# Patient Record
Sex: FEMALE | Race: BLACK OR AFRICAN AMERICAN | URBAN - METROPOLITAN AREA
[De-identification: names, ages, dates, MRNs, and addresses within clinical notes are randomized per-mention and may not be internally consistent; named-entity substitution may affect disease eponyms.]

---

## 2020-10-05 ENCOUNTER — HISTORICAL (OUTPATIENT)
Dept: ADMINISTRATIVE | Facility: HOSPITAL | Age: 60
End: 2020-10-05

## 2020-10-07 LAB — FINAL CULTURE: NORMAL

## 2024-05-01 ENCOUNTER — APPOINTMENT (RX ONLY)
Dept: URBAN - METROPOLITAN AREA CLINIC 139 | Facility: CLINIC | Age: 64
Setting detail: DERMATOLOGY
End: 2024-05-01

## 2024-05-01 DIAGNOSIS — H01.13 ECZEMATOUS DERMATITIS OF EYELID: ICD-10-CM

## 2024-05-01 DIAGNOSIS — L82.1 OTHER SEBORRHEIC KERATOSIS: ICD-10-CM

## 2024-05-01 DIAGNOSIS — L40.0 PSORIASIS VULGARIS: ICD-10-CM

## 2024-05-01 PROBLEM — L30.9 DERMATITIS, UNSPECIFIED: Status: ACTIVE | Noted: 2024-05-01

## 2024-05-01 PROBLEM — H01.134 ECZEMATOUS DERMATITIS OF LEFT UPPER EYELID: Status: ACTIVE | Noted: 2024-05-01

## 2024-05-01 PROCEDURE — ? ORDER TESTS

## 2024-05-01 PROCEDURE — ? PRESCRIPTION

## 2024-05-01 PROCEDURE — 99203 OFFICE O/P NEW LOW 30 MIN: CPT

## 2024-05-01 PROCEDURE — ? COUNSELING

## 2024-05-01 RX ORDER — FLUOCINOLONE ACETONIDE 0.11 MG/ML
OIL AURICULAR (OTIC)
Qty: 20 | Refills: 2 | Status: ERX | COMMUNITY
Start: 2024-05-01

## 2024-05-01 RX ORDER — CEPHALEXIN 500 MG/1
CAPSULE ORAL
Qty: 20 | Refills: 0 | Status: ERX | COMMUNITY
Start: 2024-05-01

## 2024-05-01 RX ORDER — PIMECROLIMUS 10 MG/G
CREAM TOPICAL BID
Qty: 30 | Refills: 1 | Status: ERX | COMMUNITY
Start: 2024-05-01

## 2024-05-01 RX ADMIN — FLUOCINOLONE ACETONIDE 1: 0.11 OIL AURICULAR (OTIC) at 00:00

## 2024-05-01 RX ADMIN — PIMECROLIMUS 1: 10 CREAM TOPICAL at 00:00

## 2024-05-01 RX ADMIN — CEPHALEXIN 1: 500 CAPSULE ORAL at 00:00

## 2024-05-01 ASSESSMENT — LOCATION DETAILED DESCRIPTION DERM
LOCATION DETAILED: LEFT LATERAL SUPERIOR EYELID
LOCATION DETAILED: RIGHT CAVUM CONCHA
LOCATION DETAILED: LEFT INFRAMAMMARY CREASE (INNER QUADRANT)
LOCATION DETAILED: PERIUMBILICAL SKIN

## 2024-05-01 ASSESSMENT — LOCATION SIMPLE DESCRIPTION DERM
LOCATION SIMPLE: RIGHT EAR
LOCATION SIMPLE: LEFT BREAST
LOCATION SIMPLE: LEFT SUPERIOR EYELID
LOCATION SIMPLE: ABDOMEN

## 2024-05-01 ASSESSMENT — LOCATION ZONE DERM
LOCATION ZONE: EAR
LOCATION ZONE: EYELID
LOCATION ZONE: TRUNK

## 2024-05-01 NOTE — PROCEDURE: ORDER TESTS
Bill For Surgical Tray: no
Expected Date Of Service: 05/01/2024
Performing Laboratory: 0
Billing Type: Third-Party Bill

## 2024-05-01 NOTE — PROCEDURE: COUNSELING
Detail Level: Simple
Quality 410: Psoriasis Clinical Response To Oral Systemic Or Biologic Medications: Patient has been on biologic or system therapy for less than 6 months
Detail Level: Generalized

## 2025-01-29 DIAGNOSIS — S82.852S DISPLACED TRIMALLEOLAR FRACTURE OF LEFT LOWER LEG, SEQUELA: Primary | ICD-10-CM

## 2025-02-10 ENCOUNTER — HOSPITAL ENCOUNTER (OUTPATIENT)
Dept: RADIOLOGY | Facility: HOSPITAL | Age: 65
Discharge: HOME | End: 2025-02-10
Attending: STUDENT IN AN ORGANIZED HEALTH CARE EDUCATION/TRAINING PROGRAM
Payer: MEDICAID

## 2025-02-10 ENCOUNTER — OFFICE VISIT (OUTPATIENT)
Dept: ORTHOPEDICS | Facility: CLINIC | Age: 65
End: 2025-02-10
Payer: MEDICAID

## 2025-02-10 VITALS
DIASTOLIC BLOOD PRESSURE: 80 MMHG | BODY MASS INDEX: 33.49 KG/M2 | TEMPERATURE: 98 F | HEIGHT: 63 IN | SYSTOLIC BLOOD PRESSURE: 132 MMHG | WEIGHT: 189 LBS

## 2025-02-10 DIAGNOSIS — S82.852S DISPLACED TRIMALLEOLAR FRACTURE OF LEFT LOWER LEG, SEQUELA: ICD-10-CM

## 2025-02-10 DIAGNOSIS — S82.852S DISPLACED TRIMALLEOLAR FRACTURE OF LEFT LOWER LEG, SEQUELA: Primary | ICD-10-CM

## 2025-02-10 PROCEDURE — 99215 OFFICE O/P EST HI 40 MIN: CPT | Mod: PBBFAC,25

## 2025-02-10 PROCEDURE — 73610 X-RAY EXAM OF ANKLE: CPT | Mod: TC,LT

## 2025-02-10 RX ORDER — HYDROCODONE BITARTRATE AND ACETAMINOPHEN 10; 325 MG/1; MG/1
1 TABLET ORAL EVERY 6 HOURS
COMMUNITY
Start: 2025-01-27 | End: 2025-02-10

## 2025-02-10 RX ORDER — CEFAZOLIN SODIUM 2 G/50ML
2 SOLUTION INTRAVENOUS
Status: CANCELLED | OUTPATIENT
Start: 2025-02-10

## 2025-02-10 RX ORDER — ROSUVASTATIN CALCIUM 40 MG/1
40 TABLET, COATED ORAL
COMMUNITY
Start: 2025-01-27

## 2025-02-10 RX ORDER — IRON,CARB/VIT C/VIT B12/FOLIC 100-250-1
1 TABLET ORAL
COMMUNITY
Start: 2024-11-22

## 2025-02-10 RX ORDER — CHOLECALCIFEROL (VITAMIN D3) 625 MCG
25000 CAPSULE ORAL
COMMUNITY
Start: 2025-02-06

## 2025-02-10 RX ORDER — NITROFURANTOIN 25; 75 MG/1; MG/1
100 CAPSULE ORAL 2 TIMES DAILY
COMMUNITY
Start: 2024-12-16

## 2025-02-10 RX ORDER — HYDROCODONE BITARTRATE AND ACETAMINOPHEN 5; 325 MG/1; MG/1
1 TABLET ORAL EVERY 6 HOURS PRN
Qty: 12 TABLET | Refills: 0 | Status: SHIPPED | OUTPATIENT
Start: 2025-02-10 | End: 2025-02-13

## 2025-02-10 RX ORDER — COVID-19 ANTIGEN TEST
KIT MISCELLANEOUS
COMMUNITY
Start: 2024-09-09

## 2025-02-10 RX ORDER — DULOXETIN HYDROCHLORIDE 60 MG/1
60 CAPSULE, DELAYED RELEASE ORAL
COMMUNITY
Start: 2025-01-27

## 2025-02-10 RX ORDER — MUPIROCIN 20 MG/G
OINTMENT TOPICAL
Status: CANCELLED | OUTPATIENT
Start: 2025-02-10

## 2025-02-10 RX ORDER — SODIUM CHLORIDE 9 MG/ML
INJECTION, SOLUTION INTRAVENOUS CONTINUOUS
Status: CANCELLED | OUTPATIENT
Start: 2025-02-10

## 2025-02-10 RX ORDER — POTASSIUM CHLORIDE 750 MG/1
10 TABLET, EXTENDED RELEASE ORAL
COMMUNITY
Start: 2025-01-27

## 2025-02-10 RX ORDER — OLMESARTAN MEDOXOMIL AND HYDROCHLOROTHIAZIDE 40/25 40; 25 MG/1; MG/1
1 TABLET ORAL
COMMUNITY
Start: 2025-01-27

## 2025-02-10 RX ORDER — AMLODIPINE BESYLATE 10 MG/1
10 TABLET ORAL
COMMUNITY
Start: 2025-01-27
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
## 2025-02-10 NOTE — LETTER
February 10, 2025      [facility]  [address] [place] [address]  Phone: [phone]       Patient: Lacy Adhikari   YOB: 1960  Date of Visit: 02/10/2025    To Whom It May Concern:    Eliazar Adhikari  was at [facility] on 02/10/2025. Patient will be out of work for 12 weeks due to surgery for ankle injury. If you have any questions or concerns, or if I can be of further assistance, please do not hesitate to contact me.    Sincerely,    Mimi Fan MA

## 2025-02-11 ENCOUNTER — ANESTHESIA EVENT (OUTPATIENT)
Dept: SURGERY | Facility: HOSPITAL | Age: 65
End: 2025-02-11
Payer: MEDICAID

## 2025-02-11 NOTE — ANESTHESIA PREPROCEDURE EVALUATION
Lacy Adhikari is a 64 y.o. female presenting for ORIF, ANKLE (Left: Ankle) with a history of   -Displaced trimalleolar fracture of left lower leg   -HTN  -HLD  -OBESITY, BMI 33    BETA-BLOCKER: NONE    New Orders for Anesthesia: BMP      Lab Results   Component Value Date    WBC 18.4 (H) 04/29/2017    HGB 12.2 04/29/2017    HCT 37.4 04/29/2017     04/29/2017       CMP  Sodium   Date Value Ref Range Status   04/29/2017 138 136 - 145 mmol/L Final     Potassium   Date Value Ref Range Status   04/29/2017 4.1 3.5 - 5.1 mmol/L Final     Chloride   Date Value Ref Range Status   04/29/2017 96 (L) 98 - 107 mmol/L Final     CO2   Date Value Ref Range Status   04/29/2017 32.0 21.0 - 32.0 mmol/L Final     Blood Urea Nitrogen   Date Value Ref Range Status   04/29/2017 21.0 (H) 7.0 - 18.0 mg/dL Final     Creatinine   Date Value Ref Range Status   04/29/2017 0.74 0.55 - 1.02 mg/dL Final     Calcium   Date Value Ref Range Status   04/29/2017 9.1 8.5 - 10.1 mg/dL Final       CARDS OV 3/19/24        Current Outpatient Medications   Medication Instructions    amLODIPine (NORVASC) 10 mg    DECARA 25,000 Units, Every 7 days    DULoxetine (CYMBALTA) 60 mg    [place] COVID-19 RAPID AT-HOME Kit Test as directed per  and cdc guidance    HYDROcodone-acetaminophen (NORCO) 5-325 mg per tablet 1 tablet, Oral, Every 6 hours PRN    IRON 100 PLUS Tab 1 tablet    nitrofurantoin, macrocrystal-monohydrate, (MACROBID) 100 MG capsule 100 mg, 2 times daily    olmesartan-hydrochlorothiazide (BENICAR HCT) 40-25 mg per tablet 1 tablet    potassium chloride (KLOR-CON) 10 MEQ TbSR 10 mEq    rosuvastatin (CRESTOR) 40 mg         Pre-op Assessment    I have reviewed the Patient Summary Reports.     I have reviewed the Nursing Notes. I have reviewed the NPO Status.   I have reviewed the Medications.     Review of Systems  Anesthesia Hx:  No problems with previous Anesthesia             Denies Family Hx of Anesthesia complications.     Denies Personal Hx of Anesthesia complications.                    Social:  Non-Smoker, No Alcohol Use       Hematology/Oncology:  Hematology Normal   Oncology Normal                                   EENT/Dental:  EENT/Dental Normal           Cardiovascular:     Hypertension, well controlled                                          Pulmonary:  Pulmonary Normal                       Renal/:  Renal/ Normal                 Hepatic/GI:  Hepatic/GI Normal                    Musculoskeletal:  Arthritis               Neurological:  Neurology Normal                                      Endocrine:  Endocrine Normal            Dermatological:  Skin Normal    Psych:  Psychiatric Normal                    Physical Exam  General: Well nourished, Cooperative, Alert and Oriented    Airway:  Mallampati: II / II  Mouth Opening: Normal  TM Distance: Normal  Tongue: Normal  Neck ROM: Normal ROM    Dental:  Intact        Anesthesia Plan  Type of Anesthesia, risks & benefits discussed:    Anesthesia Type: Gen ETT, Gen Supraglottic Airway, Gen Natural Airway  Intra-op Monitoring Plan: Standard ASA Monitors  Post Op Pain Control Plan: IV/PO Opioids PRN and peripheral nerve block  Induction:  IV  Airway Plan: Direct, Post-Induction  Informed Consent: Informed consent signed with the Patient and all parties understand the risks and agree with anesthesia plan.  All questions answered.   ASA Score: 2  Day of Surgery Review of History & Physical: I have interviewed and examined the patient. I have reviewed the patient's H&P dated:     Ready For Surgery From Anesthesia Perspective.     .

## 2025-02-12 NOTE — DISCHARGE SUMMARY
[facility]  Brief Operative Note    Surgery Date:  2/13/2025    Surgeon(s) and Role:     Artur Davies - Primary    Assisting Surgeon: Inocente Tim MD    Pre-op Diagnosis:  Trimalleolar fracture    Post-op Diagnosis:    Post-Op Diagnosis Codes:     * Displaced trimalleolar fracture of left lower leg, sequela [S82.852S]    Procedure(s) (LRB):  ORIF, ANKLE (Left)    Anesthesia: Choice    Operative Findings:  Left ankle trimalleolar fracture    Estimated Blood Loss: see full op note          Specimens:   Specimen (24h ago, onward)      None              Discharge Note    OUTCOME: Patient tolerated treatment/procedure well without complication and is now ready for discharge.    DISPOSITION: Home or Self Care    FINAL DIAGNOSIS:  Left ankle trimalleolar fracture    FOLLOWUP: In clinic    DISCHARGE INSTRUCTIONS:  Keep dressing clean, dry, intact. Take prescribed medications as needed for pain. No weight bearing through the operative extremity.
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
## 2025-02-13 ENCOUNTER — ANESTHESIA (OUTPATIENT)
Dept: SURGERY | Facility: HOSPITAL | Age: 65
End: 2025-02-13
Payer: MEDICAID

## 2025-02-13 ENCOUNTER — HOSPITAL ENCOUNTER (OUTPATIENT)
Facility: HOSPITAL | Age: 65
Discharge: HOME | End: 2025-02-13
Attending: REHABILITATION UNIT | Admitting: SPECIALIST
Payer: MEDICAID

## 2025-02-13 VITALS
OXYGEN SATURATION: 97 % | SYSTOLIC BLOOD PRESSURE: 110 MMHG | DIASTOLIC BLOOD PRESSURE: 58 MMHG | TEMPERATURE: 98 F | HEIGHT: 63 IN | WEIGHT: 199.19 LBS | HEART RATE: 95 BPM | RESPIRATION RATE: 18 BRPM | BODY MASS INDEX: 35.29 KG/M2

## 2025-02-13 DIAGNOSIS — S82.852A CLOSED DISPLACED TRIMALLEOLAR FRACTURE OF LEFT ANKLE, INITIAL ENCOUNTER: Primary | ICD-10-CM

## 2025-02-13 DIAGNOSIS — S82.852S DISPLACED TRIMALLEOLAR FRACTURE OF LEFT LOWER LEG, SEQUELA: ICD-10-CM

## 2025-02-13 LAB
ANION GAP SERPL CALC-SCNC: 10 MEQ/L
BUN SERPL-MCNC: 19.2 MG/DL (ref 9.8–20.1)
CALCIUM SERPL-MCNC: 10 MG/DL (ref 8.4–10.2)
CHLORIDE SERPL-SCNC: 99 MMOL/L (ref 98–107)
CO2 SERPL-SCNC: 31 MMOL/L (ref 23–31)
CREAT SERPL-MCNC: 1.02 MG/DL (ref 0.55–1.02)
CREAT/UREA NIT SERPL: 19
GFR SERPLBLD CREATININE-BSD FMLA CKD-EPI: >60 ML/MIN/1.73/M2
GLUCOSE SERPL-MCNC: 114 MG/DL (ref 82–115)
POTASSIUM SERPL-SCNC: 3.2 MMOL/L (ref 3.5–5.1)
SODIUM SERPL-SCNC: 140 MMOL/L (ref 136–145)

## 2025-02-13 PROCEDURE — 27201423 OPTIME MED/SURG SUP & DEVICES STERILE SUPPLY: Performed by: SPECIALIST

## 2025-02-13 PROCEDURE — 37000008 HC ANESTHESIA 1ST 15 MINUTES: Performed by: SPECIALIST

## 2025-02-13 PROCEDURE — 36000708 HC OR TIME LEV III 1ST 15 MIN: Performed by: SPECIALIST

## 2025-02-13 PROCEDURE — 64445 NJX AA&/STRD SCIATIC NRV IMG: CPT | Performed by: ANESTHESIOLOGY

## 2025-02-13 PROCEDURE — 71000016 HC POSTOP RECOV ADDL HR: Performed by: SPECIALIST

## 2025-02-13 PROCEDURE — 63600175 PHARM REV CODE 636 W HCPCS: Mod: JZ,TB | Performed by: ANESTHESIOLOGY

## 2025-02-13 PROCEDURE — 63600175 PHARM REV CODE 636 W HCPCS: Performed by: ANESTHESIOLOGY

## 2025-02-13 PROCEDURE — 63600175 PHARM REV CODE 636 W HCPCS: Performed by: NURSE ANESTHETIST, CERTIFIED REGISTERED

## 2025-02-13 PROCEDURE — 37000009 HC ANESTHESIA EA ADD 15 MINS: Performed by: SPECIALIST

## 2025-02-13 PROCEDURE — 80048 BASIC METABOLIC PNL TOTAL CA: CPT | Performed by: NURSE PRACTITIONER

## 2025-02-13 PROCEDURE — 25000003 PHARM REV CODE 250: Performed by: ANESTHESIOLOGY

## 2025-02-13 PROCEDURE — 36000709 HC OR TIME LEV III EA ADD 15 MIN: Performed by: SPECIALIST

## 2025-02-13 PROCEDURE — C1713 ANCHOR/SCREW BN/BN,TIS/BN: HCPCS | Performed by: SPECIALIST

## 2025-02-13 PROCEDURE — 71000015 HC POSTOP RECOV 1ST HR: Performed by: SPECIALIST

## 2025-02-13 PROCEDURE — 63600175 PHARM REV CODE 636 W HCPCS: Performed by: SPECIALIST

## 2025-02-13 PROCEDURE — 71000033 HC RECOVERY, INTIAL HOUR: Performed by: SPECIALIST

## 2025-02-13 DEVICE — IMPLANTABLE DEVICE: Type: IMPLANTABLE DEVICE | Site: ANKLE | Status: FUNCTIONAL

## 2025-02-13 DEVICE — SCREW CORT ST T15 3.5X14MM: Type: IMPLANTABLE DEVICE | Site: ANKLE | Status: FUNCTIONAL

## 2025-02-13 DEVICE — SCREW PANGEA LOK T8 2.7X12MM: Type: IMPLANTABLE DEVICE | Site: ANKLE | Status: FUNCTIONAL

## 2025-02-13 DEVICE — SCREW BONE CORTICAL 3.5X24MM T: Type: IMPLANTABLE DEVICE | Site: ANKLE | Status: FUNCTIONAL

## 2025-02-13 RX ORDER — IBUPROFEN 800 MG/1
800 TABLET ORAL EVERY 6 HOURS PRN
Qty: 30 TABLET | Refills: 0 | Status: SHIPPED | OUTPATIENT
Start: 2025-02-13

## 2025-02-13 RX ORDER — ASPIRIN 81 MG/1
81 TABLET ORAL 2 TIMES DAILY
Qty: 84 TABLET | Refills: 0 | Status: SHIPPED | OUTPATIENT
Start: 2025-02-13 | End: 2025-03-27

## 2025-02-13 RX ORDER — SODIUM CHLORIDE, SODIUM LACTATE, POTASSIUM CHLORIDE, CALCIUM CHLORIDE 600; 310; 30; 20 MG/100ML; MG/100ML; MG/100ML; MG/100ML
125 INJECTION, SOLUTION INTRAVENOUS CONTINUOUS
Status: ACTIVE | OUTPATIENT
Start: 2025-02-13 | End: 2025-02-13

## 2025-02-13 RX ORDER — DIPHENHYDRAMINE HYDROCHLORIDE 50 MG/ML
12.5 INJECTION INTRAMUSCULAR; INTRAVENOUS ONCE AS NEEDED
Status: DISCONTINUED | OUTPATIENT
Start: 2025-02-13 | End: 2025-02-13 | Stop reason: HOSPADM

## 2025-02-13 RX ORDER — GABAPENTIN 300 MG/1
300 CAPSULE ORAL 3 TIMES DAILY
Qty: 90 CAPSULE | Refills: 0 | Status: SHIPPED | OUTPATIENT
Start: 2025-02-13 | End: 2025-03-15

## 2025-02-13 RX ORDER — CEFAZOLIN SODIUM 1 G/3ML
2 INJECTION, POWDER, FOR SOLUTION INTRAMUSCULAR; INTRAVENOUS
Status: DISCONTINUED | OUTPATIENT
Start: 2025-02-13 | End: 2025-02-13 | Stop reason: HOSPADM

## 2025-02-13 RX ORDER — IPRATROPIUM BROMIDE AND ALBUTEROL SULFATE 2.5; .5 MG/3ML; MG/3ML
3 SOLUTION RESPIRATORY (INHALATION) ONCE AS NEEDED
Status: DISCONTINUED | OUTPATIENT
Start: 2025-02-13 | End: 2025-02-13 | Stop reason: HOSPADM

## 2025-02-13 RX ORDER — METHOCARBAMOL 500 MG/1
500 TABLET, FILM COATED ORAL 3 TIMES DAILY
Qty: 30 TABLET | Refills: 0 | Status: SHIPPED | OUTPATIENT
Start: 2025-02-13 | End: 2025-02-23

## 2025-02-13 RX ORDER — PROPOFOL 10 MG/ML
INJECTION, EMULSION INTRAVENOUS
Status: DISCONTINUED | OUTPATIENT
Start: 2025-02-13 | End: 2025-02-13

## 2025-02-13 RX ORDER — OXYCODONE HYDROCHLORIDE 5 MG/1
5 TABLET ORAL EVERY 4 HOURS PRN
Qty: 30 TABLET | Refills: 0 | Status: SHIPPED | OUTPATIENT
Start: 2025-02-13 | End: 2025-02-20

## 2025-02-13 RX ORDER — HYDROMORPHONE HYDROCHLORIDE 1 MG/ML
0.4 INJECTION, SOLUTION INTRAMUSCULAR; INTRAVENOUS; SUBCUTANEOUS EVERY 10 MIN PRN
Status: DISCONTINUED | OUTPATIENT
Start: 2025-02-13 | End: 2025-02-13 | Stop reason: HOSPADM

## 2025-02-13 RX ORDER — ROPIVACAINE HYDROCHLORIDE 5 MG/ML
INJECTION, SOLUTION EPIDURAL; INFILTRATION; PERINEURAL
Status: COMPLETED | OUTPATIENT
Start: 2025-02-13 | End: 2025-02-13

## 2025-02-13 RX ORDER — OXYCODONE AND ACETAMINOPHEN 5; 325 MG/1; MG/1
1 TABLET ORAL
Status: DISCONTINUED | OUTPATIENT
Start: 2025-02-13 | End: 2025-02-13 | Stop reason: HOSPADM

## 2025-02-13 RX ORDER — MIDAZOLAM HYDROCHLORIDE 1 MG/ML
2 INJECTION INTRAMUSCULAR; INTRAVENOUS ONCE
Status: COMPLETED | OUTPATIENT
Start: 2025-02-13 | End: 2025-02-13

## 2025-02-13 RX ORDER — ACETAMINOPHEN 500 MG
500 TABLET ORAL EVERY 6 HOURS PRN
Qty: 30 TABLET | Refills: 0 | Status: SHIPPED | OUTPATIENT
Start: 2025-02-13

## 2025-02-13 RX ORDER — DEXAMETHASONE SODIUM PHOSPHATE 4 MG/ML
INJECTION, SOLUTION INTRA-ARTICULAR; INTRALESIONAL; INTRAMUSCULAR; INTRAVENOUS; SOFT TISSUE
Status: DISCONTINUED | OUTPATIENT
Start: 2025-02-13 | End: 2025-02-13

## 2025-02-13 RX ORDER — SODIUM CHLORIDE 9 MG/ML
INJECTION, SOLUTION INTRAVENOUS CONTINUOUS
Status: DISCONTINUED | OUTPATIENT
Start: 2025-02-13 | End: 2025-02-13 | Stop reason: HOSPADM

## 2025-02-13 RX ORDER — MUPIROCIN 20 MG/G
OINTMENT TOPICAL
Status: DISCONTINUED | OUTPATIENT
Start: 2025-02-13 | End: 2025-02-13 | Stop reason: HOSPADM

## 2025-02-13 RX ORDER — BUPIVACAINE HYDROCHLORIDE 5 MG/ML
INJECTION, SOLUTION EPIDURAL; INTRACAUDAL
Status: COMPLETED | OUTPATIENT
Start: 2025-02-13 | End: 2025-02-13

## 2025-02-13 RX ORDER — PHENYLEPHRINE HYDROCHLORIDE 10 MG/ML
INJECTION INTRAVENOUS
Status: DISCONTINUED | OUTPATIENT
Start: 2025-02-13 | End: 2025-02-13

## 2025-02-13 RX ORDER — KETOROLAC TROMETHAMINE 30 MG/ML
30 INJECTION, SOLUTION INTRAMUSCULAR; INTRAVENOUS ONCE AS NEEDED
Status: DISCONTINUED | OUTPATIENT
Start: 2025-02-13 | End: 2025-02-13 | Stop reason: HOSPADM

## 2025-02-13 RX ORDER — GLUCAGON 1 MG
1 KIT INJECTION
Status: DISCONTINUED | OUTPATIENT
Start: 2025-02-13 | End: 2025-02-13 | Stop reason: HOSPADM

## 2025-02-13 RX ORDER — ONDANSETRON HYDROCHLORIDE 2 MG/ML
4 INJECTION, SOLUTION INTRAVENOUS ONCE AS NEEDED
Status: COMPLETED | OUTPATIENT
Start: 2025-02-13 | End: 2025-02-13

## 2025-02-13 RX ORDER — LIDOCAINE HYDROCHLORIDE 20 MG/ML
INJECTION INTRAVENOUS
Status: DISCONTINUED | OUTPATIENT
Start: 2025-02-13 | End: 2025-02-13

## 2025-02-13 RX ADMIN — DEXAMETHASONE SODIUM PHOSPHATE 8 MG: 4 INJECTION, SOLUTION INTRA-ARTICULAR; INTRALESIONAL; INTRAMUSCULAR; INTRAVENOUS; SOFT TISSUE at 09:02

## 2025-02-13 RX ADMIN — HYDROMORPHONE HYDROCHLORIDE 0.4 MG: 1 INJECTION, SOLUTION INTRAMUSCULAR; INTRAVENOUS; SUBCUTANEOUS at 11:02

## 2025-02-13 RX ADMIN — BUPIVACAINE HYDROCHLORIDE 10 ML: 5 INJECTION, SOLUTION EPIDURAL; INTRACAUDAL; PERINEURAL at 08:02

## 2025-02-13 RX ADMIN — PROPOFOL 140 MG: 10 INJECTION, EMULSION INTRAVENOUS at 08:02

## 2025-02-13 RX ADMIN — ROPIVACAINE HYDROCHLORIDE 20 ML: 5 INJECTION, SOLUTION EPIDURAL; INFILTRATION; PERINEURAL at 08:02

## 2025-02-13 RX ADMIN — PHENYLEPHRINE HYDROCHLORIDE 200 MCG: 10 INJECTION INTRAVENOUS at 09:02

## 2025-02-13 RX ADMIN — OXYCODONE HYDROCHLORIDE AND ACETAMINOPHEN 1 TABLET: 5; 325 TABLET ORAL at 11:02

## 2025-02-13 RX ADMIN — LIDOCAINE HYDROCHLORIDE 50 MG: 20 INJECTION INTRAVENOUS at 08:02

## 2025-02-13 RX ADMIN — ONDANSETRON 4 MG: 2 INJECTION INTRAMUSCULAR; INTRAVENOUS at 10:02

## 2025-02-13 RX ADMIN — MIDAZOLAM HYDROCHLORIDE 2 MG: 1 INJECTION, SOLUTION INTRAMUSCULAR; INTRAVENOUS at 08:02

## 2025-02-13 RX ADMIN — PHENYLEPHRINE HYDROCHLORIDE 100 MCG: 10 INJECTION INTRAVENOUS at 09:02

## 2025-02-13 NOTE — OP NOTE
Operative Note     Date of Service: 02/13/2025     Pre-Operative Diagnosis:  Left ankle trimalleolar fracture    Post-Operative Diagnosis: Same     Procedure(s):   1. Open reduction internal fixation left ankle trimalleolar fracture     Anesthesia: General     Surgeon: Artur Davies MD, was present and scrubbed for the key portions of the procedure.     Assistant(s):   Inocente Tim MD    Estimated blood loss: 25cc     Drains: None    Total IV fluids: Per anesthesia records     Complications: None    Specimens: None     Implant:   Implant Name Type Inv. Item Serial No.  Lot No. LRB No. Used Action   SCREW BONE CORTICAL 3.5X24MM T - [id number]  SCREW BONE CORTICAL 3.5X24MM T  [employer].  Left 1 Implanted   SCREW LATASHA ST T15 3.5X14MM - [id number]  SCREW LATASHA ST T15 3.5X14MM  [employer].  Left 3 Implanted   SCREW PANGEA NEL T8 2.7X10MM - [id number]  SCREW PANGEA NEL T8 2.7X10MM  [employer].  Left 5 Implanted   SCREW PANGEA NEL T8 2.7X12MM - [id number]  SCREW PANGEA NEL T8 2.7X12MM  [employer].  Left 2 Implanted   left 4 hole distal lat fib plate    ALLY  Left 1 Implanted   2.7 x 30 locking screw    ALLY  Left 1 Implanted   2.7 x 26 locking screw    ALLY  Left 2 Implanted   2.7 x 28 screw    ALLY  Left 1 Implanted   2.7 x 46 screw    ALLY  Left 1 Implanted   2.7 7 hole hook  plate    ALLY  Left 1 Implanted        Findings: Left ankle tri-malleolar fracture      Indications   Patient is a 64 y.o.female who sustained a left ankle trimalleolar fracture.  We saw the patient in clinic in had discussion with her regarding treatment options.  Patient elected to proceed with operative fixation.  The patient was checked again in the Holding Room. The risks, benefits, complications, treatment options, and expected outcomes were reviewed again with the patient. The patient has elected to proceed with left ankle trimalleolar fracture open reduction internal fixation. The  risks and potential complications include but are not limited to infection, nerve injury, vascular injury, persistent pain, potential skin necrosis, deep vein thrombosis, possible pulmonary embolus, complications of the anesthetics, nonunion, malunion and failure of the implants with potential need for future surgery to remove or revise. The patient concurred with the proposed plan, giving informed consent. The site of surgery was identified by the patient and properly noted/marked by me.       Procedure Details:   The patient was taken to Operating Room #4.  A preoperative block was administered.  The patient was given general anesthesia. The patient was placed supine on regular operating table with a bump under the operative hip and a bone foam.  Nonsterile tourniquet was placed. Prophylacic antibiotics were given. The left lower extremity was prepped and draped in normal sterile manner. A Time-Out was held and the patient, location and procedure of left ankle open reduction internal fixation were verified and agreed upon by all members of the operating room staff.     The tourniquet was inflated to 250 mmHg. An incision directly over the distal fibula was made, extending down to the subcutaneous tissue. Bleeders were identified, isolated, and cauterized.  Dissection was taken down directly to bone.  Periosteum was reflected both anteriorly and posteriorly.  Fracture was debrided with a combination of rongeur and curette.  We are able to reduce the fracture with a combination of lobster claws and point-to-point clamps.  Once our fracture was reduced, we placed a lag screw in a lag by technique design.  Afterwards we had stable fixation of our fracture and we placed a anatomic distal fibula plate.  The distal cluster was filled with locking screws and the proximal shaft screws were placed with cortical screws.  Fluoroscopy was used to confirm appropriate plate length, placement, and reduction in both AP and lateral  views.    We then turned our attention to the medial aspect of the ankle.  An incision was made directly over the medial malleolus and taken directly to bone.  Periosteum was incised and reflected both anteriorly and posteriorly until fracture was revealed.  A combination of rongeur, curette and Franklin was ued to expose our fracture and debride fracture edges.  We used a point-to-point clamp to obtain reduction of our anterior fragment and then placed 2 K-wires through to stabilize it.  In a similar fashion, our distal fragment was stabilized with a point-to-point and K-wires were sent through it.  At this point, we placed a 7 hole hook plate on the medial malleolus.  We placed the most distal screw 1st and then filled shaft screws.  We were able to place 1 screw anteriorly through the anterior fragment through the plate.    Final x-ray were taken including AP, lateral views which demonstrated appropriate reduction of fracture fragments and appropriate placement of hardware.    At this point the tourniquet was let down.  Bleeders were identified and cauterized.  We then closed both the medial and lateral incisions in a layered fashion consisting of 2-0 Vicryl, 3-0 Vicryl, 3-0 nylon.    Sterile dressing was placed consisting of Xeroform, gauze, 4x4s, ABD pad.  A well padded splint was then placed.    Instrument, sponge, and needle counts were correct prior to wound closure and at the conclusion of the case.   The patient was awoken from anesthesia, tolerated the procedure well and taken to recovery in stable condition.       Disposition: Awakened from anesthesia, and taken to the recovery room in a stable condition, having suffered no apparent untoward event     Condition: Stable     Post-Operative Management   Keep dressing clean, dry, intact. Take prescribed medications as needed for pain. No weight bearing through the operative extremity for at least 6 weeks.      Inocente Tim MD  [facility]

## 2025-02-13 NOTE — LETTER
February 13, 2025         [address] [facility] [address]  Phone: [phone]  Fax: [phone]       Patient: Lacy Adhikari   YOB: 1960  Date of Visit: 02/13/2025    To Whom It May Concern:    Eliazar Adhikari  was at [facility] on 02/13/2025. The patient may return to work/school: when released by orthopedic physician. Non weight bearing to left ankle for 6-8 weeks. If you have any questions or concerns, or if I can be of further assistance, please do not hesitate to contact me.    Sincerely,    Virgen DOAN RN

## 2025-02-13 NOTE — DISCHARGE INSTRUCTIONS
Keep follow up appointment at [facility].     · Take pain medication as prescribed.     · Keep ankle elevated on pillow.   NO weight bearing to operative extremity for 6-8 weeks.    · No driving or consuming alcohol for the next 24 hours or while taking narcotic pain medicine.     · May apply ice pack to surgical area for 20 minutes at a time 6-8 times per day.     · Keep dressing clean, intact and dry till clinic visit.     - Notify MD of any moderate to severe pain unrelieved by pain medicine or for any signs of infection including fever above 100.4, excessive redness or swelling, yellow/green foul- smelling drainage, nausea or vomiting. Call clinic at: [phone]. After business hours, if you are unable to reach a doctor on call at [phone] or your concern is an emergency, call 241 or report to your nearest emergency room.     ·  Thanks for choosing [facility]! Have a smooth recovery!

## 2025-02-13 NOTE — ANESTHESIA PROCEDURE NOTES
Intubation    Date/Time: 2/13/2025 8:34 AM    Performed by: Thuy Proctor CRNA  Authorized by: Manuel Olson MD    Intubation:     Induction:  Intravenous    Intubated:  Postinduction    Mask Ventilation:  Not attempted    Attempts:  1    Attempted By:  CRNA    Difficult Airway Encountered?: No      Complications:  None    Airway Device:  Supraglottic airway/LMA    Airway Device Size:  4.0    Style/Cuff Inflation:  Uncuffed    Placement Verified By:  Capnometry    Complicating Factors:  None    Findings Post-Intubation:  BS equal bilateral and atraumatic/condition of teeth unchanged

## 2025-02-13 NOTE — ANESTHESIA PROCEDURE NOTES
Peripheral Block    Patient location during procedure: holding area   Block not for primary anesthetic.  Reason for block: at surgeon's request and post-op pain management   Post-op Pain Location: left ankle fracture   Start time: 2/13/2025 8:12 AM  Timeout: 2/13/2025 8:10 AM   End time: 2/13/2025 8:20 AM    Staffing  Authorizing Provider: Manuel Olson MD  Performing Provider: Manuel Olson MD    Staffing  Performed by: Manuel Olson MD  Authorized by: Manuel Olson MD    Preanesthetic Checklist  Completed: patient identified, IV checked, site marked, risks and benefits discussed, surgical consent, monitors and equipment checked, pre-op evaluation and timeout performed  Peripheral Block  Patient position: right lateral decubitus  Prep: ChloraPrep  Patient monitoring: heart rate, cardiac monitor and frequent blood pressure checks  Block type: popliteal  Laterality: left  Injection technique: single shot  Needle  Needle type: Stimuplex   Needle gauge: 21 G  Needle length: 4 in  Needle localization: ultrasound guidance   -ultrasound image captured on disc.  Assessment  Injection assessment: negative aspiration, negative parasthesia and local visualized surrounding nerve  Heart rate change: no  Slow fractionated injection: yes    Medications:    Medications: ropivacaine (NAROPIN) injection 0.5% - Perineural   20 mL - 2/13/2025 8:20:00 AM

## 2025-02-13 NOTE — ANESTHESIA POSTPROCEDURE EVALUATION
Anesthesia Post Evaluation    Patient: Lacy Adhikari    Procedure(s) Performed: Procedure(s) (LRB):  ORIF, ANKLE (Left)    Final Anesthesia Type: general      Patient location during evaluation: PACU  Patient participation: Yes- Able to Participate  Level of consciousness: awake and alert and oriented  Post-procedure vital signs: reviewed and stable  Pain management: adequate  Airway patency: patent    PONV status at discharge: No PONV  Anesthetic complications: no      Cardiovascular status: blood pressure returned to baseline  Respiratory status: unassisted, spontaneous ventilation and room air  Hydration status: euvolemic  Follow-up not needed.              Vitals Value Taken Time   /58 02/13/25 1300   Temp 36.8 °C (98.2 °F) 02/13/25 1115   Pulse 95 02/13/25 1300   Resp 18 02/13/25 1300   SpO2 97 % 02/13/25 1300         Event Time   Out of Recovery 11:13:00         Pain/Librado Score: Pain Rating Prior to Med Admin: 7 (2/13/2025 11:45 AM)  Librado Score: 10 (2/13/2025 11:13 AM)  Modified Librado Score: 19 (2/13/2025  1:05 PM)

## 2025-02-13 NOTE — TRANSFER OF CARE
"Anesthesia Transfer of Care Note    Patient: Lacy Adhikari    Procedure(s) Performed: Procedure(s) (LRB):  ORIF, ANKLE (Left)    Patient location: PACU    Anesthesia Type: general    Transport from OR: Transported from OR on room air with adequate spontaneous ventilation    Post pain: adequate analgesia    Post assessment: no apparent anesthetic complications and tolerated procedure well    Post vital signs: stable    Level of consciousness: sedated    Nausea/Vomiting: no nausea/vomiting    Complications: none    Transfer of care protocol was followed      Last vitals: Visit Vitals  /77   Pulse 72   Temp 36 °C (96.8 °F) (Temporal)   Resp 20   Ht 5' 2.99" (1.6 m)   Wt 90.4 kg (199 lb 3.2 oz)   SpO2 100%   Breastfeeding No   BMI 35.30 kg/m²     "

## 2025-02-13 NOTE — ANESTHESIA PROCEDURE NOTES
Peripheral Block    Patient location during procedure: holding area   Block not for primary anesthetic.  Reason for block: at surgeon's request and post-op pain management   Post-op Pain Location: left ankle   Start time: 2/13/2025 8:20 AM  Timeout: 2/13/2025 8:10 AM   End time: 2/13/2025 8:25 AM    Staffing  Authorizing Provider: Manuel Olson MD  Performing Provider: Manuel Olson MD    Staffing  Performed by: Manuel Olson MD  Authorized by: Manuel Olson MD    Preanesthetic Checklist  Completed: patient identified, IV checked, site marked, risks and benefits discussed, surgical consent, monitors and equipment checked, pre-op evaluation and timeout performed  Peripheral Block  Patient position: supine  Prep: ChloraPrep  Patient monitoring: heart rate, cardiac monitor, continuous pulse ox and frequent blood pressure checks  Block type: adductor canal  Laterality: left  Injection technique: single shot  Needle  Needle type: Stimuplex   Needle gauge: 21 G  Needle length: 4 in  Needle localization: ultrasound guidance   -ultrasound image captured on disc.  Assessment  Injection assessment: negative aspiration and negative parasthesia  Paresthesia pain: none  Heart rate change: no  Slow fractionated injection: yes  Pain Tolerance: comfortable throughout block and no complaints  Medications:    Medications: bupivacaine (pf) (MARCAINE) injection 0.5% - Perineural   10 mL - 2/13/2025 8:30:00 AM

## 2025-02-14 ENCOUNTER — TELEPHONE (OUTPATIENT)
Dept: ORTHOPEDICS | Facility: CLINIC | Age: 65
End: 2025-02-14
Payer: MEDICAID

## 2025-02-14 NOTE — PROGRESS NOTES
Faculty Attestation: I was present and scrubbed throughout the key elements of the procedure.  I agree with the resident's findings and description.    Kingston Davies  Orthopaedic Surgery

## 2025-02-14 NOTE — TELEPHONE ENCOUNTER
Called patient to let her know that her FMLA forms are ready to be .  She will send her daughter to get them.

## 2025-02-26 ENCOUNTER — HOSPITAL ENCOUNTER (OUTPATIENT)
Dept: RADIOLOGY | Facility: HOSPITAL | Age: 65
Discharge: HOME | End: 2025-02-26
Attending: STUDENT IN AN ORGANIZED HEALTH CARE EDUCATION/TRAINING PROGRAM
Payer: MEDICAID

## 2025-02-26 ENCOUNTER — OFFICE VISIT (OUTPATIENT)
Dept: ORTHOPEDICS | Facility: CLINIC | Age: 65
End: 2025-02-26
Payer: MEDICAID

## 2025-02-26 VITALS
DIASTOLIC BLOOD PRESSURE: 74 MMHG | WEIGHT: 199 LBS | HEIGHT: 62 IN | TEMPERATURE: 98 F | SYSTOLIC BLOOD PRESSURE: 108 MMHG | BODY MASS INDEX: 36.62 KG/M2

## 2025-02-26 DIAGNOSIS — S82.852S DISPLACED TRIMALLEOLAR FRACTURE OF LEFT LOWER LEG, SEQUELA: ICD-10-CM

## 2025-02-26 DIAGNOSIS — S82.852S DISPLACED TRIMALLEOLAR FRACTURE OF LEFT LOWER LEG, SEQUELA: Primary | ICD-10-CM

## 2025-02-26 PROCEDURE — 99213 OFFICE O/P EST LOW 20 MIN: CPT | Mod: PBBFAC,25

## 2025-02-26 PROCEDURE — 73610 X-RAY EXAM OF ANKLE: CPT | Mod: TC,LT

## 2025-02-26 RX ORDER — CYCLOBENZAPRINE HCL 5 MG
5 TABLET ORAL 3 TIMES DAILY PRN
Qty: 30 TABLET | Refills: 0 | Status: SHIPPED | OUTPATIENT
Start: 2025-02-26 | End: 2025-03-08

## 2025-02-26 NOTE — PROGRESS NOTES
Faculty Attestation: Lacy Adhikari  was seen at [facility] in the Orthopaedic Clinic. Discussed with the resident at the time of the visit. History of Present Illness, Physical Exam, and Assessment and Plan reviewed. Treatment plan is reasonable and appropriate. Compliance with treatment recommendations is important. No procedure was performed.     Buck Luong MD  Orthopaedic Surgery

## 2025-02-26 NOTE — PROGRESS NOTES
[facility] progress note     Surgeries:  ORIF left trimalleolar ankle fracture 02/13/2025     HPI:  Patient returns today for follow up.  She has been compliant with weight-bearing restrictions.  She states that she still has some pain and swelling in the ankle.  No problems with the incisions.  She has been wearing the splint.  No numbness or tingling.    There were no vitals filed for this visit.    PE  Surgical incision is healing appropriately with sutures in place.  No erythema or drainage.  No dehiscence.    Motor intact EHL/FHL/TA/GSC   [place] SP/DP/SP/SP/T   Foot warm well perfused  Appropriate postoperative tenderness    Imaging   X-rays of the left ankle obtained today demonstrate orthopedic hardware in place without evidence of loosening or complication.  Well-maintained reductions of the medial and lateral malleoli    A/P:64 y.o.F status post ORIF left trimalleolar ankle fracture 02/13/2025     Sutures out, Steri-Strips  CAM boot  NWB LLE 6 weeks   Fu 4 weeks for xrays, transition to WBAT    Buck Iqbal  [facility] Orth[facility] PGY5

## 2025-03-26 ENCOUNTER — OFFICE VISIT (OUTPATIENT)
Dept: ORTHOPEDICS | Facility: CLINIC | Age: 65
End: 2025-03-26
Payer: MEDICAID

## 2025-03-26 ENCOUNTER — HOSPITAL ENCOUNTER (OUTPATIENT)
Dept: RADIOLOGY | Facility: HOSPITAL | Age: 65
Discharge: HOME | End: 2025-03-26
Attending: ORTHOPAEDIC SURGERY
Payer: MEDICAID

## 2025-03-26 VITALS
HEIGHT: 62 IN | DIASTOLIC BLOOD PRESSURE: 82 MMHG | TEMPERATURE: 99 F | BODY MASS INDEX: 36.62 KG/M2 | WEIGHT: 199 LBS | SYSTOLIC BLOOD PRESSURE: 130 MMHG

## 2025-03-26 DIAGNOSIS — M25.572 CHRONIC PAIN OF LEFT ANKLE: ICD-10-CM

## 2025-03-26 DIAGNOSIS — G89.29 CHRONIC PAIN OF LEFT ANKLE: ICD-10-CM

## 2025-03-26 DIAGNOSIS — G89.29 CHRONIC PAIN OF LEFT ANKLE: Primary | ICD-10-CM

## 2025-03-26 DIAGNOSIS — M25.572 CHRONIC PAIN OF LEFT ANKLE: Primary | ICD-10-CM

## 2025-03-26 PROCEDURE — 73610 X-RAY EXAM OF ANKLE: CPT | Mod: TC,LT

## 2025-03-26 PROCEDURE — 99213 OFFICE O/P EST LOW 20 MIN: CPT | Mod: PBBFAC,25

## 2025-03-26 NOTE — PROGRESS NOTES
Faculty Attestation: Lacy Blanc Onofre  was seen at [facility] in the Orthopaedic Clinic. Discussed with the resident at the time of the visit. History of Present Illness, Physical Exam, and Assessment and Plan reviewed. Treatment plan is reasonable and appropriate. Compliance with treatment recommendations is important. No procedure was performed.     Alf Cooley MD  Orthopaedic Surgery

## 2025-03-26 NOTE — LETTER
March 26, 2025      [facility]  [address] [place] [address]  Phone: [phone]       Patient: Lacy Adhikari   YOB: 1960  Date of Visit: 03/26/2025    To Whom It May Concern:    Eliazar Adhikari  was at [facility] on 03/26/2025. The patient may return to work/school on 05/07/2025 with no restrictions. If you have any questions or concerns, or if I can be of further assistance, please do not hesitate to contact me.    Sincerely,    Mimi Fan MA

## 2025-03-26 NOTE — PROGRESS NOTES
[facility] progress note     Surgeries:  ORIF left trimalleolar ankle fracture 02/13/2025     HPI:  Patient returns today for follow up.  Patient has doing okay.  She still has a little bit of pain around the ankle.  She has been staying off it.  No issues with the incision.    Vitals:    03/26/25 0802   BP: 130/82   Temp: 98.7 °F (37.1 °C)       PE  Surgical incision well healed.  No erythema or drainage.  No dehiscence.    Motor intact EHL/FHL/TA/GSC   [place] SP/DP/SP/SP/T   Foot warm well perfused  Appropriate postoperative tenderness    Imaging   X-rays of the left ankle obtained today demonstrate orthopedic hardware in place without evidence of loosening or complication.  Well-maintained reductions of the medial and lateral malleoli    A/P:64 y.o.F status post ORIF left trimalleolar ankle fracture 02/13/2025     Transition to weight-bearing as tolerated in the cam boot   Physical therapy script provided   We will see her back 6 weeks repeat x-rays      Buck Iqbal  [facility] PGY5

## 2025-05-07 ENCOUNTER — HOSPITAL ENCOUNTER (OUTPATIENT)
Dept: RADIOLOGY | Facility: HOSPITAL | Age: 65
Discharge: HOME | End: 2025-05-07
Attending: STUDENT IN AN ORGANIZED HEALTH CARE EDUCATION/TRAINING PROGRAM
Payer: MEDICAID

## 2025-05-07 ENCOUNTER — OFFICE VISIT (OUTPATIENT)
Dept: ORTHOPEDICS | Facility: CLINIC | Age: 65
End: 2025-05-07
Payer: MEDICAID

## 2025-05-07 VITALS
HEIGHT: 62 IN | OXYGEN SATURATION: 97 % | RESPIRATION RATE: 17 BRPM | DIASTOLIC BLOOD PRESSURE: 77 MMHG | BODY MASS INDEX: 37.48 KG/M2 | SYSTOLIC BLOOD PRESSURE: 131 MMHG | TEMPERATURE: 98 F | WEIGHT: 203.69 LBS | HEART RATE: 82 BPM

## 2025-05-07 DIAGNOSIS — M25.572 LEFT ANKLE PAIN, UNSPECIFIED CHRONICITY: Primary | ICD-10-CM

## 2025-05-07 DIAGNOSIS — M25.572 LEFT ANKLE PAIN, UNSPECIFIED CHRONICITY: ICD-10-CM

## 2025-05-07 PROCEDURE — 99214 OFFICE O/P EST MOD 30 MIN: CPT | Mod: PBBFAC,25

## 2025-05-07 PROCEDURE — 73610 X-RAY EXAM OF ANKLE: CPT | Mod: TC,LT

## 2025-05-07 NOTE — LETTER
Pt Name: Lacy Adhikari  YOB: 1960   Employer: [employer] to record [employer]   Job Title: nurse assistant/patient care       Doctors comments: Please advise status of patients ability to [id number].  Employee may only returned to work if she is able to perform sedentary duties.  She is unable to tolerate standing for more than 1 hour at a time.  She will be re-evaluated in clinic 8 weeks from this date to evaluate her progress with therapy.  Please contact our clinic with further questions or concerns            Provider Signature/Printed Name:  [facility] Orthopedic surgery Date:05/07/2025     [facility]  [facility]  [address] [address]

## 2025-05-07 NOTE — PROGRESS NOTES
Faculty Attestation: Lacy Hankacie Adhikari  was seen at [facility] in the Orthopaedic Clinic in the outpatient department at a [facility]. Patient seen and evaluated at the time of the visit.  I participated in the management of the patient and was immediately available throughout the encounter. History of Present Illness, Physical Exam, and Assessment and Plan reviewed. Treatment plan is reasonable and appropriate. Compliance with treatment recommendations is important. No procedure was performed.     Simon Vick MD  Orthopedic Surgery Chief

## 2025-05-07 NOTE — PROGRESS NOTES
\[facility]\"" Orthopedic surgery Clinic progress note     Surgeries:  ORIF left trimalleolar ankle fracture 02/13/2025     HPI:    Patient returns today complaining of ongoing pain in the ankle.  She states she is unable to tolerate returning to work as they are standing most of the time at work.  She can not stand on the left ankle for more than an hour at a time.  She is requesting a work excuse and paperwork filled out for disability.  She denies new injuries.  She has transitioned into normal shoes.  She is also requesting another prescription for physical therapy she feels this is helping.    Vitals:    05/07/25 1041   BP: 131/77   Pulse: 82   Resp: 17   Temp: 98.1 °F (36.7 °C)       PE  Surgical incision well healed.  Motor intact EHL/FHL/TA/GSC   [place] SP/DP/SA/[place]/T   Postoperative tenderness to palpation over medial and lateral malleoli  Foot warm and well-perfused        Imaging   X-rays of the left ankle obtained today demonstrate orthopedic hardware in place without evidence of loosening or complication.  Well-maintained reductions of the medial and lateral malleoli    A/P:  64 y.o.F status post ORIF left trimalleolar ankle fracture 02/13/2025.  Still having postoperative pain medially and laterally and unable to tolerate standing for long periods.      Work restrictions provided today, she is unable to tolerate standing for more than 1 hour at a time due to pain  Physical therapy script provided   We will see her back 8 weeks with new x-rays, re-evaluate her progress with therapy and work status            Juloi Alaniz  [facility] PGY5

## 2025-07-02 ENCOUNTER — HOSPITAL ENCOUNTER (OUTPATIENT)
Dept: RADIOLOGY | Facility: HOSPITAL | Age: 65
Discharge: HOME | End: 2025-07-02
Attending: SPECIALIST
Payer: MEDICAID

## 2025-07-02 ENCOUNTER — OFFICE VISIT (OUTPATIENT)
Dept: ORTHOPEDICS | Facility: CLINIC | Age: 65
End: 2025-07-02
Payer: MEDICAID

## 2025-07-02 VITALS
SYSTOLIC BLOOD PRESSURE: 103 MMHG | WEIGHT: 203 LBS | TEMPERATURE: 98 F | BODY MASS INDEX: 37.36 KG/M2 | HEIGHT: 62 IN | DIASTOLIC BLOOD PRESSURE: 70 MMHG

## 2025-07-02 DIAGNOSIS — G89.29 CHRONIC PAIN OF LEFT ANKLE: Primary | ICD-10-CM

## 2025-07-02 DIAGNOSIS — G89.29 CHRONIC PAIN OF LEFT ANKLE: ICD-10-CM

## 2025-07-02 DIAGNOSIS — M25.572 CHRONIC PAIN OF LEFT ANKLE: Primary | ICD-10-CM

## 2025-07-02 DIAGNOSIS — M25.572 CHRONIC PAIN OF LEFT ANKLE: ICD-10-CM

## 2025-07-02 PROCEDURE — 99213 OFFICE O/P EST LOW 20 MIN: CPT | Mod: PBBFAC,25

## 2025-07-02 PROCEDURE — 3074F SYST BP LT 130 MM HG: CPT | Mod: CPTII,,, | Performed by: SPECIALIST

## 2025-07-02 PROCEDURE — 1159F MED LIST DOCD IN RCRD: CPT | Mod: CPTII,,, | Performed by: SPECIALIST

## 2025-07-02 PROCEDURE — 3008F BODY MASS INDEX DOCD: CPT | Mod: CPTII,,, | Performed by: SPECIALIST

## 2025-07-02 PROCEDURE — 3078F DIAST BP <80 MM HG: CPT | Mod: CPTII,,, | Performed by: SPECIALIST

## 2025-07-02 PROCEDURE — 99214 OFFICE O/P EST MOD 30 MIN: CPT | Mod: S$PBB,,, | Performed by: SPECIALIST

## 2025-07-02 PROCEDURE — 73610 X-RAY EXAM OF ANKLE: CPT | Mod: TC,LT

## 2025-07-02 NOTE — LETTER
July 2, 2025      [facility]  [address] [place] [address]  Phone: [phone]       Patient: Lacy Adhikari   YOB: 1960  Date of Visit: 07/02/2025    To Whom It May Concern:    Eliazar Adhikari  was at [facility] on 07/02/2025. The patient may return to work/school on 07/03/2025 with restrictions. Please allow patient to take frequent breaks due to right ankle injury. If you have any questions or concerns, or if I can be of further assistance, please do not hesitate to contact me.    Sincerely,    Mimi Fan MA

## 2025-07-02 NOTE — PROGRESS NOTES
[facility] Orthopedic surgery Clinic progress note     Surgeries:  ORIF left trimalleolar ankle fracture 02/13/2025     HPI:    Patient returns today with continued pain in her left ankle.  She reports that the skin has healed well and she has no signs of local or systemic infection.  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  She says that she can not return to work.  She is always on her feet.  She has transitioned to normal shoes.  She is out of the boot.  She is doing physical therapy and notes some improvement in her symptoms.    Vitals:    07/02/25 0744   BP: 103/70   Temp: 97.7 °F (36.5 °C)       PE  Surgical incision well healed.  Motor intact EHL/FHL/TA/GSC   [place] SP/DP/SA/[place]/T   Tenderness to palpation over medial and lateral malleoli/hardware  Able to dorsiflex to 5° past neutral  Foot warm and well-perfused    Imaging   X-rays of the left ankle obtained today demonstrate orthopedic hardware in place without evidence of loosening or complication.  Well-maintained reductions of the medial and lateral malleoli.  No fracture lines present.  Joint space preserved with no evidence of arthritis.  No screws in the joint space.    A/P:  65 y.o.F status post ORIF left trimalleolar ankle fracture 02/13/2025.  Still having postoperative pain medially and laterally and unable to tolerate standing for long periods.    - Continue physical therapy   - Ankle lace-up brace for work/when she is on her feet for long periods of time  - OTC pain medications  - okay to return to work but patient should avoid activities that cause her left ankle pain.  She may require frequent breaks or sedentary work  - follow up 3 months    Balwinder Liu  [facility] PGY5

## 2025-07-02 NOTE — PROGRESS NOTES
Faculty Attestation: Lacy Adhikari  was seen at [facility] in the Orthopaedic Clinic. Discussed with the resident at the time of the visit. History of Present Illness, Physical Exam, and Assessment and Plan reviewed. Treatment plan is reasonable and appropriate. Compliance with treatment recommendations is important. No procedure was performed.     Kingston Davies MD  Orthopaedic Surgery

## (undated) DEVICE — GLOVE PROTEXIS HYDROGEL SZ8

## (undated) DEVICE — PAD ABDOMINAL STERILE 8X10IN

## (undated) DEVICE — SUT VICRYL CTD 2-0 GI 27 SH

## (undated) DEVICE — APPLICATOR CHLORAPREP ORN 26ML

## (undated) DEVICE — GLOVE PROTEXIS LTX MICRO 8

## (undated) DEVICE — DRAPE C-ARMOR EQUIPMENT COVER

## (undated) DEVICE — BIT PANGEA DRILL SM 2.5X135MM

## (undated) DEVICE — SPONGE LAP 18X18 PREWASHED

## (undated) DEVICE — GLOVE SENSICARE PI GRN 7

## (undated) DEVICE — SUT ETHILON 3-0 FS-1 30

## (undated) DEVICE — GUIDEWIRE UT 1.4X150MM
Type: IMPLANTABLE DEVICE | Site: ANKLE | Status: NON-FUNCTIONAL
Removed: 2025-02-13

## (undated) DEVICE — DRESSING N ADH OIL EMUL 3X8 3S

## (undated) DEVICE — PENCIL ELECSURG ROCKER 15FT

## (undated) DEVICE — BNDG NS SWIFT WRP ELAS 6INX5YD

## (undated) DEVICE — SOL NACL IRR 1000ML BTL

## (undated) DEVICE — GOWN POLY REINF X-LONG 2XL

## (undated) DEVICE — K-WIRE TROCAR POINT 1.6X150MM
Type: IMPLANTABLE DEVICE | Site: ANKLE | Status: NON-FUNCTIONAL
Removed: 2025-02-13

## (undated) DEVICE — COVER C-ARM STRAP BAND 44X80IN

## (undated) DEVICE — Device

## (undated) DEVICE — ELECTRODE PATIENT RETURN DISP

## (undated) DEVICE — TOWEL OR DISP STRL BLUE 4/PK

## (undated) DEVICE — STOCKINETTE IMPERVIOUS 16X54IN

## (undated) DEVICE — GLOVE PROTEXIS BLUE LATEX 8

## (undated) DEVICE — BIT PANGEA DRILL SM 2.0X135MM

## (undated) DEVICE — IMPLANTABLE DEVICE
Type: IMPLANTABLE DEVICE | Site: ANKLE | Status: NON-FUNCTIONAL
Removed: 2025-02-13

## (undated) DEVICE — COVER MAYO STND XL 30X57IN

## (undated) DEVICE — CUFF ATS 2 PORT SNGL BLDR 34IN

## (undated) DEVICE — SPONGE GAUZE 4X4 12 PLY STRL

## (undated) DEVICE — SUT VICRYL 3-0 27 SH

## (undated) DEVICE — DRAPE STERI U-SHAPED 47X51IN

## (undated) DEVICE — KIT SURGICAL TURNOVER

## (undated) DEVICE — BLADE SURG STAINLESS STEEL #15

## (undated) DEVICE — DRAPE T EXTRM SURG 121X128X90